# Patient Record
Sex: MALE | Race: WHITE | URBAN - METROPOLITAN AREA
[De-identification: names, ages, dates, MRNs, and addresses within clinical notes are randomized per-mention and may not be internally consistent; named-entity substitution may affect disease eponyms.]

---

## 2022-12-22 ENCOUNTER — TELEPHONE (OUTPATIENT)
Dept: ORTHOPEDIC SURGERY | Age: 42
End: 2022-12-22

## 2022-12-22 NOTE — TELEPHONE ENCOUNTER
As soon as we have medical info to review we will make recommendation about seeing patient, most likely will be next week for appt  Electronically signed by Essence Ferrer PA-C on 12/22/2022 at 12:33 PM

## 2022-12-22 NOTE — TELEPHONE ENCOUNTER
Call rec'd from PT @ [facility]. Regarding this pt. Stating pt had hip replacement sx by Dr. Mirna Cohen and fixed a fx of femur during sx. Pt wasn't progressing and had a 2nd opinion which indicated an additional femur fx below sx site. They are looking for a surgeon who would be willing to attempt to fix. He indicated pt is at 30 degrees right now. He will fax medical/op report/imaging reports. I advised to fax medical to [phone]. Routing to provider.

## 2022-12-27 NOTE — TELEPHONE ENCOUNTER
Call to [facility] d/t phone issues he could not hear me. Scheduled pt @ [facility] will call tomorrow to inform.

## 2022-12-28 NOTE — TELEPHONE ENCOUNTER
Call to [facility] lvm needing pt demographics. Looked in [employer]  under external Med record rec'd does list pt ph#.   Call to pt [phone] advised per call from [facility] requesting 2nd opinion by Dr. Ngozi Addison  appt scheduled   Future Appointments   Date Time Provider Iram Acosta   1/3/2023  9:30 AM SCHEDULE, [facility] [place] address/ph# for office

## 2023-01-03 ENCOUNTER — OFFICE VISIT (OUTPATIENT)
Dept: ORTHOPEDIC SURGERY | Age: 43
End: 2023-01-03
Payer: COMMERCIAL

## 2023-01-03 VITALS — WEIGHT: 185 LBS | HEIGHT: 72 IN | BODY MASS INDEX: 25.06 KG/M2

## 2023-01-03 DIAGNOSIS — M25.551 RIGHT HIP PAIN: Primary | ICD-10-CM

## 2023-01-03 DIAGNOSIS — S72.321P: ICD-10-CM

## 2023-01-03 PROBLEM — S72.323P: Status: ACTIVE | Noted: 2023-01-03

## 2023-01-03 PROCEDURE — 99204 OFFICE O/P NEW MOD 45 MIN: CPT | Performed by: ORTHOPAEDIC SURGERY

## 2023-01-03 RX ORDER — BUSPIRONE HYDROCHLORIDE 10 MG/1
10 TABLET ORAL 3 TIMES DAILY
COMMUNITY

## 2023-01-03 RX ORDER — MORPHINE SULFATE 30 MG/1
30 TABLET ORAL 2 TIMES DAILY
COMMUNITY

## 2023-01-03 RX ORDER — TRAMADOL HYDROCHLORIDE 50 MG/1
50 TABLET ORAL PRN
COMMUNITY

## 2023-01-03 NOTE — PROGRESS NOTES
Orthopaedic New Patient Note        Deven Bond is a 40 y.o. 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 traMADol (ULTRAM) 50 MG tablet Take 50 mg by mouth as needed for Pain. busPIRone (BUSPAR) 10 MG tablet Take 10 mg by mouth 3 times daily       No current facility-administered medications for this visit. Allergies: Patient has no known allergies. History reviewed. No pertinent past medical history. History reviewed. No pertinent surgical history. History reviewed. No pertinent family history. Social History     Tobacco Use    Smoking status: Not on file    Smokeless tobacco: Not on file   Substance Use Topics    Alcohol use: Not on file                           Review of Systems   Constitutional: Negative for fever and chills. HENT: Negative for ear pain, sore throat and sinus pressure. Eyes: Negative for pain, discharge and redness. Respiratory: Negative for cough, shortness of breath and wheezing. Cardiovascular: Negative for chest pain. Gastrointestinal: Negative for nausea, vomiting, diarrhea and abdominal distention. Genitourinary: Negative for dysuria and frequency. Musculoskeletal: Negative for back pain and arthralgias. All other systems reviewed and negative. CC: Right thigh pain    S: Deven Bond is a 40 y.o. who is here today for initial evaluation for his right leg. Patient states he had initial right total hip arthroplasty back in February 2022 by Dr. Hoa Rutledge. States he sustained an intraoperative fracture which required cable fixation. States he was allowed to bear weight on the hip but however he continued to have pain. States it became most severe in June and July. He continued to try and progress with therapy and activities however he continued to have symptoms. Also noticed the leg was significantly shorter than the other and had a large bump over the anterior lateral thigh. Subsequent imaging was then obtained demonstrating fracture at the distal extent of the stem with angulation. He was subsequently referred here for further management. Patient states he also sought second opinion at [address] [place] [address]. Stated that there was discussion on his left hip being bad also. States his low back and left hip are hurting more as he is ambulating awkwardly due to the leg length discrepancy currently. Feels that the pain to the right thigh has improved and that this fracture has healed. Denies any numbness or tingling of the right leg. Works at an [employer] [address] [employer] Admits to history of JRA indicating the early right JOHNNIE. Physical Exam  Ht 6' (1.829 m)   Wt 185 lb (83.9 kg) Comment: per pt  BMI 25.09 kg/m²   O:   CONSTITUTIONAL: awake, alert, cooperative, no apparent distress, and appears stated age  EYES: Lids and lashes normal, pupils equal, round and reactive to light, extra ocular muscles intact, sclera clear, conjunctiva normal  ENT: Normocephalic, without obvious abnormality, atraumatic, external ears without lesions, oral pharynx with moist mucus membranes  NECK: Trachea midline, skin normal  LUNGS: No increased work of breathing, good air exchange  CARDIOVASCULAR: 2+ pulses throughout and capillary Refill less than 2 seconds  ABDOMEN: soft, non-distended, non-tender and no rebound tenderness or guarding  NEUROLOGIC: Awake, alert, oriented to name, place and time. Cranial nerves II-XII are grossly intact. Motor is 5 out of 5 bilaterally. Sensory is intact.    Right Lower Extremity Exam:  Surgical incision is well-healed, it is dry and intact  There is obvious deformity of the thigh with palpable bony prominence anterior lateral thigh and deformity, significant shortening of the right leg relative to the left through the femur due to angulation  Demonstrates active flexion/abduction, knee flexion/extension, ankle plantar/dorsiflexion/great toe extension. States sensation intact to gross touch in sural/deep peroneal/superficial peroneal/saphenous/posterior tibial nerve distributions to foot/ankle. Palpable dorsalis pedis and posterior tibialis pulses, cap refill brisk in toes, foot warm/perfused. Compartments supple throughout thigh and leg, calves supple/NT    Xrays Reviewed  Status post right JOHNNIE, press-fit with 5 adjunctive cables about the proximal femoral diaphysis, there is fracture at the tip of the stem with significant varus angulation and apex anterior angulation, there is significant bony callus demonstrating healing of the fracture. ASSESSMENT:    ICD-10-CM    1. Right hip pain  M25.551 XR HIP 2-3 VW W PELVIS RIGHT     XR FEMUR RIGHT (MIN 2 VIEWS)      2. Closed displaced transverse fracture of shaft of right femur with malunion, subsequent encounter  S72.321P           PLAN:   Reviewed the imaging in detail with patient today, discussed his femoral shaft malunion, discussed that this is attributing to his leg length discrepancy  Discussed treatment options for this which would include corrective osteotomy and formal ORIF, discussed plate and screw fixation due to retained JOHNNIE, discussed his JOHNNIE stem appears to be well fixated and would consider maintaining the stem rather than conversion. Discussed this would require healing of the osteotomy site as well as period of limited weightbearing. Patient states he would like to discuss with his significant other and would reach back out to our office if interested in surgery.   Also discussed his recent blood work which was essentially normal with normal infection labs the only abnormal result was vitamin D deficiency and we discussed vitamin D supplementation. Electronically Signed By  Beau Zhu DO  1/3/23    NOTE: This report was transcribed using voice recognition software.  Every effort was made to ensure accuracy; however, inadvertent computerized transcription errors may be present